# Patient Record
Sex: MALE | Race: WHITE | NOT HISPANIC OR LATINO
[De-identification: names, ages, dates, MRNs, and addresses within clinical notes are randomized per-mention and may not be internally consistent; named-entity substitution may affect disease eponyms.]

---

## 2018-01-16 ENCOUNTER — APPOINTMENT (OUTPATIENT)
Dept: OPHTHALMOLOGY | Facility: CLINIC | Age: 78
End: 2018-01-16
Payer: COMMERCIAL

## 2018-01-16 DIAGNOSIS — H40.051 OCULAR HYPERTENSION, RIGHT EYE: ICD-10-CM

## 2018-01-16 DIAGNOSIS — H40.003 PREGLAUCOMA, UNSPECIFIED, BILATERAL: ICD-10-CM

## 2018-01-16 PROCEDURE — 92083 EXTENDED VISUAL FIELD XM: CPT

## 2018-01-16 PROCEDURE — 92020 GONIOSCOPY: CPT

## 2018-01-16 PROCEDURE — 92133 CPTRZD OPH DX IMG PST SGM ON: CPT

## 2018-01-16 PROCEDURE — 92014 COMPRE OPH EXAM EST PT 1/>: CPT

## 2019-02-22 ENCOUNTER — APPOINTMENT (OUTPATIENT)
Dept: OPHTHALMOLOGY | Facility: CLINIC | Age: 79
End: 2019-02-22

## 2019-05-17 ENCOUNTER — APPOINTMENT (OUTPATIENT)
Dept: OPHTHALMOLOGY | Facility: CLINIC | Age: 79
End: 2019-05-17

## 2019-08-06 ENCOUNTER — APPOINTMENT (OUTPATIENT)
Dept: OPHTHALMOLOGY | Facility: CLINIC | Age: 79
End: 2019-08-06
Payer: COMMERCIAL

## 2019-08-06 ENCOUNTER — NON-APPOINTMENT (OUTPATIENT)
Age: 79
End: 2019-08-06

## 2019-08-06 PROCEDURE — 92133 CPTRZD OPH DX IMG PST SGM ON: CPT

## 2019-08-06 PROCEDURE — 92083 EXTENDED VISUAL FIELD XM: CPT

## 2019-08-06 PROCEDURE — 92020 GONIOSCOPY: CPT

## 2019-08-06 PROCEDURE — 92014 COMPRE OPH EXAM EST PT 1/>: CPT

## 2020-08-11 ENCOUNTER — APPOINTMENT (OUTPATIENT)
Dept: OPHTHALMOLOGY | Facility: CLINIC | Age: 80
End: 2020-08-11

## 2020-09-29 ENCOUNTER — APPOINTMENT (OUTPATIENT)
Dept: OPHTHALMOLOGY | Facility: CLINIC | Age: 80
End: 2020-09-29

## 2020-10-12 ENCOUNTER — APPOINTMENT (OUTPATIENT)
Dept: CARDIOLOGY | Facility: CLINIC | Age: 80
End: 2020-10-12

## 2020-10-19 ENCOUNTER — APPOINTMENT (OUTPATIENT)
Dept: CARDIOLOGY | Facility: CLINIC | Age: 80
End: 2020-10-19

## 2020-11-11 ENCOUNTER — APPOINTMENT (OUTPATIENT)
Dept: CARDIOLOGY | Facility: CLINIC | Age: 80
End: 2020-11-11

## 2021-09-08 ENCOUNTER — NON-APPOINTMENT (OUTPATIENT)
Age: 81
End: 2021-09-08

## 2021-09-08 ENCOUNTER — APPOINTMENT (OUTPATIENT)
Dept: OPHTHALMOLOGY | Facility: CLINIC | Age: 81
End: 2021-09-08
Payer: COMMERCIAL

## 2021-09-08 PROCEDURE — 92012 INTRM OPH EXAM EST PATIENT: CPT

## 2022-03-03 RX ORDER — SODIUM CHLORIDE 9 MG/ML
1000 INJECTION, SOLUTION INTRAVENOUS
Refills: 0 | Status: DISCONTINUED | OUTPATIENT
Start: 2022-03-08 | End: 2022-03-09

## 2022-03-07 NOTE — ASU PATIENT PROFILE, ADULT - NSICDXPASTSURGICALHX_GEN_ALL_CORE_FT
PAST SURGICAL HISTORY:  History of colon surgery      PAST SURGICAL HISTORY:  History of cardiac catheterization     History of colon surgery     History of coronary angioplasty with insertion of stent

## 2022-03-07 NOTE — ASU PATIENT PROFILE, ADULT - NSICDXPASTMEDICALHX_GEN_ALL_CORE_FT
PAST MEDICAL HISTORY:  Cancer, colon     HTN (hypertension)     Hyperlipidemia     Stroke 8 years ago     PAST MEDICAL HISTORY:  Afib     CAD (coronary artery disease)     Cancer, colon     HTN (hypertension)     Hyperlipidemia     Stroke 8 years ago

## 2022-03-08 ENCOUNTER — NON-APPOINTMENT (OUTPATIENT)
Age: 82
End: 2022-03-08

## 2022-03-08 ENCOUNTER — OUTPATIENT (OUTPATIENT)
Dept: OUTPATIENT SERVICES | Facility: HOSPITAL | Age: 82
LOS: 1 days | Discharge: HOME | End: 2022-03-08
Payer: COMMERCIAL

## 2022-03-08 ENCOUNTER — APPOINTMENT (OUTPATIENT)
Dept: OPHTHALMOLOGY | Facility: AMBULATORY SURGERY CENTER | Age: 82
End: 2022-03-08

## 2022-03-08 VITALS
TEMPERATURE: 98 F | HEART RATE: 89 BPM | OXYGEN SATURATION: 98 % | SYSTOLIC BLOOD PRESSURE: 119 MMHG | RESPIRATION RATE: 16 BRPM | DIASTOLIC BLOOD PRESSURE: 76 MMHG

## 2022-03-08 VITALS
TEMPERATURE: 98 F | HEIGHT: 72 IN | DIASTOLIC BLOOD PRESSURE: 69 MMHG | WEIGHT: 186.73 LBS | RESPIRATION RATE: 16 BRPM | OXYGEN SATURATION: 98 % | SYSTOLIC BLOOD PRESSURE: 105 MMHG | HEART RATE: 102 BPM

## 2022-03-08 DIAGNOSIS — Z98.890 OTHER SPECIFIED POSTPROCEDURAL STATES: Chronic | ICD-10-CM

## 2022-03-08 DIAGNOSIS — Z95.5 PRESENCE OF CORONARY ANGIOPLASTY IMPLANT AND GRAFT: Chronic | ICD-10-CM

## 2022-03-08 PROCEDURE — V2787: CPT | Mod: RT

## 2022-03-08 PROCEDURE — 6698F: CPT | Mod: RT

## 2022-03-08 PROCEDURE — 66984 XCAPSL CTRC RMVL W/O ECP: CPT | Mod: RT

## 2022-03-08 DEVICE — IMPLANTABLE DEVICE
Type: IMPLANTABLE DEVICE | Site: RIGHT | Status: NON-FUNCTIONAL
Removed: 2022-03-08

## 2022-03-08 RX ORDER — PHENYLEPHRINE HCL 2.5 %
1 DROPS OPHTHALMIC (EYE)
Refills: 0 | Status: COMPLETED | OUTPATIENT
Start: 2022-03-08 | End: 2022-03-08

## 2022-03-08 RX ORDER — SODIUM CHLORIDE 9 MG/ML
1000 INJECTION, SOLUTION INTRAVENOUS
Refills: 0 | Status: DISCONTINUED | OUTPATIENT
Start: 2022-03-08 | End: 2022-03-08

## 2022-03-08 RX ORDER — TROPICAMIDE 1 %
1 DROPS OPHTHALMIC (EYE)
Refills: 0 | Status: COMPLETED | OUTPATIENT
Start: 2022-03-08 | End: 2022-03-08

## 2022-03-08 RX ORDER — OFLOXACIN 0.3 %
1 DROPS OPHTHALMIC (EYE)
Refills: 0 | Status: COMPLETED | OUTPATIENT
Start: 2022-03-08 | End: 2022-03-08

## 2022-03-08 RX ORDER — ACETAMINOPHEN 500 MG
2 TABLET ORAL
Qty: 0 | Refills: 0 | DISCHARGE
Start: 2022-03-08

## 2022-03-08 RX ORDER — ONDANSETRON 8 MG/1
4 TABLET, FILM COATED ORAL ONCE
Refills: 0 | Status: DISCONTINUED | OUTPATIENT
Start: 2022-03-08 | End: 2022-03-08

## 2022-03-08 RX ORDER — ACETAMINOPHEN 500 MG
650 TABLET ORAL ONCE
Refills: 0 | Status: DISCONTINUED | OUTPATIENT
Start: 2022-03-08 | End: 2022-03-08

## 2022-03-08 RX ADMIN — Medication 1 DROP(S): at 09:20

## 2022-03-08 RX ADMIN — Medication 1 DROP(S): at 09:25

## 2022-03-08 RX ADMIN — Medication 1 DROP(S): at 09:16

## 2022-03-08 RX ADMIN — Medication 1 DROP(S): at 09:17

## 2022-03-08 NOTE — OPERATIVE REPORT - OPERATIVE RPOSRT DETAILS
PATIENT: WELLES, HALSTED	    ACCOUNT  NUMBER:  [id number]     OPERATING SURGEON:  Dr. Kash Coker	    ASSISTANT SURGEON:  Dr. Gagan Mar    DATE OF SURGERY: 3/8/22    ANESTHESIA:  MAC/TOPICAL	    ESTIMATED BLOOD LOSS: < 1mL	    COMPLICATIONS:  None	    PREOPERATIVE DIAGNOSIS:  Cataract,  right eye    POSTOPERATIVE DIAGNOSIS:  Cataract, right eye    OPERATIVE PROCEDURE:  1. Femtosecond laser assisted laser surgery        right eye  2. Phacoemulsification with insertion of toric posterior chamber intraocular lens with intraoperative ORA  right eye    LENS IMPLANT: SN6AT3 +18.0    PROCEDURE:	The patient was brought to the laser room where certain aspects of the procedure were performed with the femtosecond laser.     The patient was then brought into the operating room and placed supine on the operating room table. After uneventful induction of neuroleptic anesthesia, lidocaine gel was instilled into the operative eye achieving sufficient anesthesia.  The patient was then prepped and draped in the usual sterile fashion.  A wire lid speculum was then inserted into the operative eye giving a wide palpebral fissure.      	A josh knife was used to perform paracenteses through clear cornea at the 12 and 6 o’clock limbal positions.  The anterior chamber was irrigated with lidocaine 1% nonpreserved followed by epinephrine 0.1% nonpreserved.  Viscoelastic was then used to maintain the anterior chamber.  A keratome was used to create a clear corneal incision just inside the temporal limbus.  Capsulorhexis forceps were used to complete the capsulorhexis. Balanced salt solution was used to hydrodissect the nucleus.  The [employer] phacoemulsification unit was then used to completely phacoemulsify the nucleus, following which an aspirating handpiece was used to aspirate all cortical remnants from the capsular bag.  Viscoelastic was  used to reform the anterior chamber and capsular bag.      	Intraoperative ORA was used to determine the appropriate lens. A new foldable posterior chamber intraocular lens was brought into the surgical field.  It was folded and directly injected into the capsular bag following which it was centered and secure.  All viscoelastic was then aspirated from the anterior segment using an aspirating handpiece.  All wounds were checked for leaks and there were none.  A solution consisting of 4 mg dexamethasone, 100 mg cefazolin was used to irrigate the surface of the eye.  The lid speculum was removed from the eye and a shield placed over the eye.      	The patient tolerated the procedure well and went to the recovery area in good condition.       PATIENT: WELLES, HALSTED	    ACCOUNT  NUMBER:  [id number]     OPERATING SURGEON:  Dr. Kash Coker	    ASSISTANT SURGEON:  Dr. Gagan Mar    DATE OF SURGERY: 3/8/22    ANESTHESIA:  MAC/TOPICAL	    ESTIMATED BLOOD LOSS: < 1mL	    COMPLICATIONS:  None	    PREOPERATIVE DIAGNOSIS:  Cataract,  right eye    POSTOPERATIVE DIAGNOSIS:  Cataract, right eye    OPERATIVE PROCEDURE:  1. Femtosecond laser assisted laser surgery        right eye  2. Phacoemulsification with insertion of toric posterior chamber intraocular lens with intraoperative ORA  right eye    LENS IMPLANT: SN6AT3 +18.0    PROCEDURE:	The patient was brought to the laser room where certain aspects of the procedure were performed with the femtosecond laser.     The patient was then brought into the operating room and placed supine on the operating room table. After uneventful induction of neuroleptic anesthesia, lidocaine gel was instilled into the operative eye achieving sufficient anesthesia.  The patient was then prepped and draped in the usual sterile fashion.  A wire lid speculum was then inserted into the operative eye giving a wide palpebral fissure.      	A josh knife was used to perform paracenteses through clear cornea at the 12 and 6 o’clock limbal positions.  The anterior chamber was irrigated with lidocaine 1% nonpreserved followed by epinephrine 0.1% nonpreserved.  Viscoelastic was then used to maintain the anterior chamber.  A keratome was used to create a clear corneal incision just inside the temporal limbus.  Capsulorhexis forceps were used to complete the capsulorhexis. Balanced salt solution was used to hydrodissect the nucleus.  The [employer] phacoemulsification unit was then used to completely phacoemulsify the nucleus, following which an aspirating handpiece was used to aspirate all cortical remnants from the capsular bag.  Viscoelastic was  used to reform the anterior chamber and capsular bag.      	Intraoperative ORA was used to determine the appropriate lens. A new foldable posterior chamber intraocular lens was brought into the surgical field.  It was folded and directly injected into the capsular bag following which ORA was again performed to determine the appropriate orientation of the IOL. All viscoelastic was then aspirated from the anterior segment using an aspirating handpiece.  All wounds were checked for leaks and there were none.  A solution consisting of 4 mg dexamethasone, 100 mg cefazolin was used to irrigate the surface of the eye.  The lid speculum was removed from the eye and a shield placed over the eye.      	The patient tolerated the procedure well and went to the recovery area in good condition.

## 2022-03-09 ENCOUNTER — NON-APPOINTMENT (OUTPATIENT)
Age: 82
End: 2022-03-09

## 2022-03-09 ENCOUNTER — APPOINTMENT (OUTPATIENT)
Dept: OPHTHALMOLOGY | Facility: CLINIC | Age: 82
End: 2022-03-09
Payer: COMMERCIAL

## 2022-03-09 PROCEDURE — 99024 POSTOP FOLLOW-UP VISIT: CPT

## 2022-03-23 ENCOUNTER — APPOINTMENT (OUTPATIENT)
Dept: OPHTHALMOLOGY | Facility: CLINIC | Age: 82
End: 2022-03-23

## 2022-03-24 PROBLEM — E78.5 HYPERLIPIDEMIA, UNSPECIFIED: Chronic | Status: ACTIVE | Noted: 2022-03-07

## 2022-03-24 PROBLEM — I25.10 ATHEROSCLEROTIC HEART DISEASE OF NATIVE CORONARY ARTERY WITHOUT ANGINA PECTORIS: Chronic | Status: ACTIVE | Noted: 2022-03-08

## 2022-03-24 PROBLEM — I63.9 CEREBRAL INFARCTION, UNSPECIFIED: Chronic | Status: ACTIVE | Noted: 2022-03-07

## 2022-03-24 PROBLEM — C18.9 MALIGNANT NEOPLASM OF COLON, UNSPECIFIED: Chronic | Status: ACTIVE | Noted: 2022-03-07

## 2022-03-24 PROBLEM — I10 ESSENTIAL (PRIMARY) HYPERTENSION: Chronic | Status: ACTIVE | Noted: 2022-03-07

## 2022-03-24 PROBLEM — I48.91 UNSPECIFIED ATRIAL FIBRILLATION: Chronic | Status: ACTIVE | Noted: 2022-03-08

## 2022-03-28 NOTE — ASU PATIENT PROFILE, ADULT - NSICDXPASTMEDICALHX_GEN_ALL_CORE_FT
PAST MEDICAL HISTORY:  Afib     CAD (coronary artery disease)     Cancer, colon     HTN (hypertension)     Hyperlipidemia     Stroke 8 years ago     PAST MEDICAL HISTORY:  Afib     CAD (coronary artery disease)     Cancer, colon     Carotid stenosis     HTN (hypertension)     Hyperlipidemia     Ischemic cardiomyopathy     NSVT (nonsustained ventricular tachycardia)     Stroke 8 years ago

## 2022-03-28 NOTE — ASU PATIENT PROFILE, ADULT - FALL HARM RISK - UNIVERSAL INTERVENTIONS
Bed in lowest position, wheels locked, appropriate side rails in place/Call bell, personal items and telephone in reach/Instruct patient to call for assistance before getting out of bed or chair/Non-slip footwear when patient is out of bed/[place] to call system/Physically safe environment - no spills, clutter or unnecessary equipment/Purposeful Proactive Rounding/Room/bathroom lighting operational, light cord in reach

## 2022-03-28 NOTE — ASU PATIENT PROFILE, ADULT - NSICDXPASTSURGICALHX_GEN_ALL_CORE_FT
PAST SURGICAL HISTORY:  Cataract of right eye     History of cardiac catheterization     History of colon surgery     History of coronary angioplasty with insertion of stent

## 2022-03-29 ENCOUNTER — TRANSCRIPTION ENCOUNTER (OUTPATIENT)
Age: 82
End: 2022-03-29

## 2022-03-29 ENCOUNTER — APPOINTMENT (OUTPATIENT)
Dept: OPHTHALMOLOGY | Facility: AMBULATORY SURGERY CENTER | Age: 82
End: 2022-03-29

## 2022-03-29 ENCOUNTER — OUTPATIENT (OUTPATIENT)
Dept: OUTPATIENT SERVICES | Facility: HOSPITAL | Age: 82
LOS: 1 days | Discharge: HOME | End: 2022-03-29
Payer: COMMERCIAL

## 2022-03-29 ENCOUNTER — NON-APPOINTMENT (OUTPATIENT)
Age: 82
End: 2022-03-29

## 2022-03-29 VITALS
OXYGEN SATURATION: 99 % | SYSTOLIC BLOOD PRESSURE: 128 MMHG | HEART RATE: 110 BPM | WEIGHT: 187.39 LBS | DIASTOLIC BLOOD PRESSURE: 82 MMHG | HEIGHT: 72 IN | TEMPERATURE: 98 F | RESPIRATION RATE: 16 BRPM

## 2022-03-29 VITALS
TEMPERATURE: 97 F | SYSTOLIC BLOOD PRESSURE: 122 MMHG | RESPIRATION RATE: 17 BRPM | HEART RATE: 77 BPM | DIASTOLIC BLOOD PRESSURE: 78 MMHG | OXYGEN SATURATION: 96 %

## 2022-03-29 DIAGNOSIS — Z98.890 OTHER SPECIFIED POSTPROCEDURAL STATES: Chronic | ICD-10-CM

## 2022-03-29 DIAGNOSIS — H26.9 UNSPECIFIED CATARACT: Chronic | ICD-10-CM

## 2022-03-29 DIAGNOSIS — Z95.5 PRESENCE OF CORONARY ANGIOPLASTY IMPLANT AND GRAFT: Chronic | ICD-10-CM

## 2022-03-29 PROCEDURE — 66984 XCAPSL CTRC RMVL W/O ECP: CPT | Mod: LT,79

## 2022-03-29 PROCEDURE — 6698F: CPT

## 2022-03-29 PROCEDURE — V2787: CPT

## 2022-03-29 DEVICE — IMPLANTABLE DEVICE
Type: IMPLANTABLE DEVICE | Status: NON-FUNCTIONAL
Removed: 2022-03-29

## 2022-03-29 RX ORDER — ROSUVASTATIN CALCIUM 5 MG/1
1 TABLET ORAL
Qty: 0 | Refills: 0 | DISCHARGE

## 2022-03-29 RX ORDER — OFLOXACIN 0.3 %
1 DROPS OPHTHALMIC (EYE)
Refills: 0 | Status: COMPLETED | OUTPATIENT
Start: 2022-03-29 | End: 2022-03-29

## 2022-03-29 RX ORDER — ACETAMINOPHEN 500 MG
1000 TABLET ORAL ONCE
Refills: 0 | Status: DISCONTINUED | OUTPATIENT
Start: 2022-03-29 | End: 2022-03-29

## 2022-03-29 RX ORDER — METOPROLOL TARTRATE 50 MG
25 TABLET ORAL
Qty: 0 | Refills: 0 | DISCHARGE

## 2022-03-29 RX ORDER — SODIUM CHLORIDE 9 MG/ML
1000 INJECTION, SOLUTION INTRAVENOUS
Refills: 0 | Status: DISCONTINUED | OUTPATIENT
Start: 2022-03-29 | End: 2022-03-29

## 2022-03-29 RX ORDER — TROPICAMIDE 1 %
1 DROPS OPHTHALMIC (EYE)
Refills: 0 | Status: COMPLETED | OUTPATIENT
Start: 2022-03-29 | End: 2022-03-29

## 2022-03-29 RX ORDER — EZETIMIBE 10 MG/1
1 TABLET ORAL
Qty: 0 | Refills: 0 | DISCHARGE

## 2022-03-29 RX ORDER — PHENYLEPHRINE HCL 2.5 %
1 DROPS OPHTHALMIC (EYE)
Refills: 0 | Status: COMPLETED | OUTPATIENT
Start: 2022-03-29 | End: 2022-03-29

## 2022-03-29 RX ORDER — CLOPIDOGREL BISULFATE 75 MG/1
1 TABLET, FILM COATED ORAL
Qty: 0 | Refills: 0 | DISCHARGE

## 2022-03-29 RX ORDER — RIVAROXABAN 15 MG-20MG
1 KIT ORAL
Qty: 0 | Refills: 0 | DISCHARGE

## 2022-03-29 RX ORDER — CANDESARTAN CILEXETIL 8 MG/1
16 TABLET ORAL
Qty: 0 | Refills: 0 | DISCHARGE

## 2022-03-29 RX ADMIN — Medication 1 DROP(S): at 09:20

## 2022-03-29 RX ADMIN — Medication 1 DROP(S): at 09:25

## 2022-03-29 RX ADMIN — Medication 1 DROP(S): at 09:19

## 2022-03-29 RX ADMIN — Medication 1 DROP(S): at 09:30

## 2022-03-29 NOTE — ASU DISCHARGE PLAN (ADULT/PEDIATRIC) - NS MD DC FALL RISK RISK
For information on Fall & Injury Prevention, visit: [email or web address] OR  [email or web address] OR  https://www.cdc.gov/steadi/patient.html

## 2022-03-29 NOTE — ASU PREOP CHECKLIST - BSA (M2)
Post-Care Instructions: I reviewed with the patient in detail post-care instructions. Patient is to wear sunprotection, and avoid picking at any of the treated lesions. Pt may apply Vaseline to crusted or scabbing areas. Detail Level: Detailed Include Z78.9 (Other Specified Conditions Influencing Health Status) As An Associated Diagnosis?: No Medical Necessity Information: It is in your best interest to select a reason for this procedure from the list below. All of these items fulfill various CMS LCD requirements except the new and changing color options. Consent: The patient's consent was obtained including but not limited to risks of crusting, scabbing, blistering, scarring, darker or lighter pigmentary change, recurrence, incomplete removal and infection. Medical Necessity Clause: This procedure was medically necessary because the lesions that were treated were: Render Post-Care Instructions In Note?: yes 2.07

## 2022-03-29 NOTE — OPERATIVE REPORT - OPERATIVE RPOSRT DETAILS
SURGEON: Dr. Kash Coker    ASSISTANT SURGEON:  Dr. Gagan Mar    DATE OF SURGERY: 3/29/22    ANESTHESIA:  MAC/TOPICAL		    COMPLICATIONS:  None	    PREOPERATIVE DIAGNOSIS:  Cataract, Left eye    POSTOPERATIVE DIAGNOSIS:  Cataract, Left eye    OPERATIVE PROCEDURE:  1. Femtosecond laser assisted laser surgery        Left eye  2. Phacoemulsification with insertion of posterior chamber intraocular lens  Left eye  3. Intraoperative ORA Left eye    LENS IMPLANT: SN6AT4 +17.5    PROCEDURE:	The patient was brought to the laser room where certain aspects of the procedure were performed with the femtosecond laser.     The patient was then brought into the operating room and placed supine on the operating room table. After uneventful induction of neuroleptic anesthesia, lidocaine gel was instilled into the operative eye achieving sufficient anesthesia.  The patient was then prepped and draped in the usual sterile fashion.  A wire lid speculum was then inserted into the operative eye giving a wide palpebral fissure.      	A josh knife was used to perform paracenteses through clear cornea at the 12 and 6 o’clock limbal positions.  The anterior chamber was irrigated with lidocaine 1% nonpreserved followed by epinephrine 0.1% nonpreserved.  Viscoelastic was then used to maintain the anterior chamber.  A keratome was used to create a clear corneal incision just inside the temporal limbus.  Capsulorhexis forceps were used to complete the capsulorhexis. Balanced salt solution was used to hydrodissect the nucleus.  The [employer] phacoemulsification unit was then used to completely phacoemulsify the nucleus, following which an aspirating handpiece was used to aspirate all cortical remnants from the capsular bag.  Viscoelastic was  used to reform the anterior chamber and capsular bag.      	Intraoperative ORA was performed to help determine the appropriate IOL. A new foldable posterior chamber intraocular lens was brought into the surgical field.  It was folded and directly injected into the capsular bag following which ORA was again performed to determine the appropriate orientation of the IOL. All viscoelastic was then aspirated from the anterior segment using an aspirating handpiece.  All wounds were checked for leaks and there were none.  Moxifloxacin 0.3% solution was used to irrigate the surface of the eye.  The lid speculum was removed from the eye and a shield placed over the eye.      	The patient tolerated the procedure well and went to the recovery area in good condition.      					  Kash Coker M.D.

## 2022-03-30 ENCOUNTER — APPOINTMENT (OUTPATIENT)
Dept: OPHTHALMOLOGY | Facility: CLINIC | Age: 82
End: 2022-03-30
Payer: COMMERCIAL

## 2022-03-30 ENCOUNTER — NON-APPOINTMENT (OUTPATIENT)
Age: 82
End: 2022-03-30

## 2022-03-30 PROCEDURE — 99024 POSTOP FOLLOW-UP VISIT: CPT

## 2022-04-06 ENCOUNTER — APPOINTMENT (OUTPATIENT)
Dept: OPHTHALMOLOGY | Facility: CLINIC | Age: 82
End: 2022-04-06

## 2023-02-21 NOTE — ASU PATIENT PROFILE, ADULT - NS SC CAGE ALCOHOL ANNOYED YOU
Service Date: 02/21/2023    HISTORY OF PRESENT ILLNESS:  Chava is a very nice 62-year-old gentleman, who appears younger than his stated age and appears to be very fit individual.    Past medical history is significant for coronary artery disease, hypercholesterolemia, mildly dilated ascending aorta and ischemic cardiomyopathy.    I met him in 2017 as he wanted to establish with Cardiology and was having some side effects with statins.  His coronary artery history dates back to stenting of his proximal left anterior descending artery for unstable angina in 2012.  In 07/2020, again presented with unstable angina.  He underwent complex stenting of a very angulated first obtuse marginal, requiring stents in the obtuse marginal, proximal and mid circumflex coronary artery.  This did result in some plaque shift into his proximal left anterior descending artery.  Nonetheless, this resulted in complete resolution of his symptoms.  The patient did not tolerate metoprolol because of fatigue, tiredness and lack of energy.  He did seek a second opinion at [facility] to review his angiogram after his complex procedure of July.    Chava returns to clinic stating overall he thinks he is doing better.  He is still haunted by a chest discomfort that occurs about every 4-6 weeks.  As he stated, it is a vague chest discomfort.  It does not occur when he works out, which he does quite regularly, and he states in the past, he has noted it occurs when he is tired or fatigued.  He calls it a twinge.  He states it may last up to 10 or 15 minutes.  He occasionally take nitroglycerin for it, which it does respond to nitroglycerin.  He is now retired and did have a stressful job.  He also thinks it may be related to how well he sleeps at nighttime.    He works out, doing some sort of aerobic activity every day of the week, and then he will lift weights 2-3 days a week and, as stated, this usually does not cause him any problems  whatsoever.  He has no lightheadedness, dizziness, syncope or near syncope.  He has no palpitations.  He notes no side effects or problems with his current medical regimen.  He was having problems with rosuvastatin interfering with his sleep, which he has had a couple of times.  He has now been switched to atorvastatin 10, and it is not interfering with his sleep.    ASSESSMENT AND PLAN:  Chava has what appears to be a chronic stable angina pattern; it is not necessarily exertional.  We did do a stress test on him a year ago for which he was able to go 13 minutes of the standard Frantz protocol and appeared to be completely normal.  I suspect he may occasionally be having spasm and that we will continue with taking nitroglycerin on a p.r.n. basis.  I have told him if it should change, increase in frequency, duration, be triggered by exercise, then we need to see him and we would probably run a stress test, possibly repeat his angiogram.  But at this point, he states it has been running this pattern ever since his intervention and has not changed.    He has no symptoms to suggest heart failure or significant arrhythmia.    Blood pressure was high when he came in today.  He definitely has white coat hypertension, although on my recheck, I get 138/82.  He states at home, he typically is going to get 120/80.    Fasting lipid profile is a bit of a backslide on 10 mg of atorvastatin.  Total cholesterol is 171, HDL is 86, LDL is 72, triglycerides are 63.  We talked about the most recent guidelines to get under 55.  We talked about the different parameters.  At this time, we will increase his atorvastatin to 20 mg daily.  I will have him follow up with my GERONIMO in 1 month and we will see what his fasting lipid profile is on the higher dose.  If he does not tolerate this, I may switch him to pravastatin, but I would like to get his LDL below 50.  We will also follow up to see how his chest discomfort is doing.    He has got a  normal creatinine and normal electrolytes.  I will have him follow up with my GERONIMO in 6 months.  I will see him back in a year, in addition to his follow-up in 1 month.    Jan Park MD, [facility]        D: 2023   T: 2023   MT: al    Name:     NGA RIOS  MRN:      [id number]        Account:      [id number]   :      1960           Service Date: 2023       Document: [id number]   no

## 2023-07-04 NOTE — ASU PATIENT PROFILE, ADULT - NS PRO PT RIGHT SUPPORT PERSON
Pt's mother called and approved for Kathy Valerio to be added to call list. Pt's mother advised Ms. Valerio was a positive role model and is employed by [employer]. Pt's mother was advised this had to be approved by the care team. Pt's mother was accepting of the process.    Kathy Valerio [phone]   same name as above Declines

## 2023-12-26 NOTE — ASU PREOP CHECKLIST - VERIFY SURGICAL SITE/SIDE WITH PATIENT

## 2025-07-02 NOTE — ASU PREOP CHECKLIST - BP NONINVASIVE DIASTOLIC (MM HG)
[Dear  ___] : Dear  [unfilled], [Consult Letter:] : I had the pleasure of evaluating your patient, [unfilled]. [Please see my note below.] : Please see my note below. [Consult Closing:] : Thank you very much for allowing me to participate in the care of this patient.  If you have any questions, please do not hesitate to contact me. [Sincerely,] : Sincerely, [FreeTextEntry3] : Yaritza Evans MD [facility]  70

## (undated) DEVICE — CANNULA IRR ANT CHAMBER 30G

## (undated) DEVICE — RUBBER BANDS STERILE

## (undated) DEVICE — PACK CENTURION 2.4MM

## (undated) DEVICE — SOL IRR BAL SALT 500ML

## (undated) DEVICE — SPECIMEN CONTAINER 4OZ

## (undated) DEVICE — PACK ANTERIOR SEGMENT

## (undated) DEVICE — GLV 8 PROTEXIS (WHITE)

## (undated) DEVICE — NDL HYPO SAFE 25G X 5/8" (ORANGE)

## (undated) DEVICE — CANNULA FLEX TIP 45 DEG 27GAX22MM

## (undated) DEVICE — SUT NYLON 10-0 12" CU-5

## (undated) DEVICE — CATARACT KIT

## (undated) DEVICE — DRSG TAPE MICROPORE SURGICAL TAPE .5"X10 YDS TAN

## (undated) DEVICE — WARMING BLANKET LOWER ADULT

## (undated) DEVICE — KNIFE ALCON STANDARD FULL HANDLE 15 DEG (PINK)

## (undated) DEVICE — NDL RETROBULBAR VISITEC 25X1.5

## (undated) DEVICE — CANNULA ANT CHMBR 27GX22MM

## (undated) DEVICE — Device

## (undated) DEVICE — DRAPE MICROSCOPE KNOB COVER SMALL (2 PCS)

## (undated) DEVICE — VENODYNE/SCD SLEEVE CALF MEDIUM